# Patient Record
Sex: FEMALE | Race: WHITE
[De-identification: names, ages, dates, MRNs, and addresses within clinical notes are randomized per-mention and may not be internally consistent; named-entity substitution may affect disease eponyms.]

---

## 2021-08-27 ENCOUNTER — HOSPITAL ENCOUNTER (EMERGENCY)
Dept: HOSPITAL 61 - ER | Age: 41
Discharge: HOME | End: 2021-08-27
Payer: MEDICAID

## 2021-08-27 VITALS — HEIGHT: 64 IN | BODY MASS INDEX: 29.36 KG/M2 | WEIGHT: 171.96 LBS

## 2021-08-27 VITALS — DIASTOLIC BLOOD PRESSURE: 109 MMHG | SYSTOLIC BLOOD PRESSURE: 227 MMHG

## 2021-08-27 DIAGNOSIS — I10: Primary | ICD-10-CM

## 2021-08-27 DIAGNOSIS — Z88.6: ICD-10-CM

## 2021-08-27 DIAGNOSIS — R51.9: ICD-10-CM

## 2021-08-27 DIAGNOSIS — Z88.1: ICD-10-CM

## 2021-08-27 DIAGNOSIS — Z88.5: ICD-10-CM

## 2021-08-27 LAB
ALBUMIN SERPL-MCNC: 3.6 G/DL (ref 3.4–5)
ALBUMIN/GLOB SERPL: 1 {RATIO} (ref 1–1.7)
ALP SERPL-CCNC: 121 U/L (ref 46–116)
ALT SERPL-CCNC: 35 U/L (ref 14–59)
ANION GAP SERPL CALC-SCNC: 10 MMOL/L (ref 6–14)
APTT PPP: YELLOW S
AST SERPL-CCNC: 19 U/L (ref 15–37)
BACTERIA #/AREA URNS HPF: 0 /HPF
BASOPHILS # BLD AUTO: 0 X10^3/UL (ref 0–0.2)
BASOPHILS NFR BLD: 1 % (ref 0–3)
BILIRUB SERPL-MCNC: 0.2 MG/DL (ref 0.2–1)
BILIRUB UR QL STRIP: NEGATIVE
BUN SERPL-MCNC: 13 MG/DL (ref 7–20)
BUN/CREAT SERPL: 16 (ref 6–20)
CALCIUM SERPL-MCNC: 8.9 MG/DL (ref 8.5–10.1)
CHLORIDE SERPL-SCNC: 105 MMOL/L (ref 98–107)
CK SERPL-CCNC: 58 U/L (ref 26–192)
CO2 SERPL-SCNC: 28 MMOL/L (ref 21–32)
CREAT SERPL-MCNC: 0.8 MG/DL (ref 0.6–1)
EOSINOPHIL NFR BLD: 0.1 X10^3/UL (ref 0–0.7)
EOSINOPHIL NFR BLD: 1 % (ref 0–3)
ERYTHROCYTE [DISTWIDTH] IN BLOOD BY AUTOMATED COUNT: 13.4 % (ref 11.5–14.5)
FIBRINOGEN PPP-MCNC: CLEAR MG/DL
GFR SERPLBLD BASED ON 1.73 SQ M-ARVRAT: 79 ML/MIN
GLUCOSE SERPL-MCNC: 90 MG/DL (ref 70–99)
HCT VFR BLD CALC: 39 % (ref 36–47)
HGB BLD-MCNC: 13.6 G/DL (ref 12–15.5)
LYMPHOCYTES # BLD: 2.5 X10^3/UL (ref 1–4.8)
LYMPHOCYTES NFR BLD AUTO: 27 % (ref 24–48)
MCH RBC QN AUTO: 30 PG (ref 25–35)
MCHC RBC AUTO-ENTMCNC: 35 G/DL (ref 31–37)
MCV RBC AUTO: 87 FL (ref 79–100)
MONO #: 0.7 X10^3/UL (ref 0–1.1)
MONOCYTES NFR BLD: 8 % (ref 0–9)
NEUT #: 6.1 X10^3/UL (ref 1.8–7.7)
NEUTROPHILS NFR BLD AUTO: 64 % (ref 31–73)
NITRITE UR QL STRIP: NEGATIVE
PH UR STRIP: 7 [PH]
PLATELET # BLD AUTO: 259 X10^3/UL (ref 140–400)
POTASSIUM SERPL-SCNC: 3.6 MMOL/L (ref 3.5–5.1)
PROT SERPL-MCNC: 7.1 G/DL (ref 6.4–8.2)
PROT UR STRIP-MCNC: NEGATIVE MG/DL
RBC # BLD AUTO: 4.47 X10^6/UL (ref 3.5–5.4)
RBC #/AREA URNS HPF: 0 /HPF (ref 0–2)
SODIUM SERPL-SCNC: 143 MMOL/L (ref 136–145)
UROBILINOGEN UR-MCNC: 0.2 MG/DL
WBC # BLD AUTO: 9.5 X10^3/UL (ref 4–11)
WBC #/AREA URNS HPF: (no result) /HPF (ref 0–4)

## 2021-08-27 PROCEDURE — 99284 EMERGENCY DEPT VISIT MOD MDM: CPT

## 2021-08-27 PROCEDURE — 81025 URINE PREGNANCY TEST: CPT

## 2021-08-27 PROCEDURE — 82553 CREATINE MB FRACTION: CPT

## 2021-08-27 PROCEDURE — 96375 TX/PRO/DX INJ NEW DRUG ADDON: CPT

## 2021-08-27 PROCEDURE — 80053 COMPREHEN METABOLIC PANEL: CPT

## 2021-08-27 PROCEDURE — 36415 COLL VENOUS BLD VENIPUNCTURE: CPT

## 2021-08-27 PROCEDURE — 85025 COMPLETE CBC W/AUTO DIFF WBC: CPT

## 2021-08-27 PROCEDURE — 96361 HYDRATE IV INFUSION ADD-ON: CPT

## 2021-08-27 PROCEDURE — 93005 ELECTROCARDIOGRAM TRACING: CPT

## 2021-08-27 PROCEDURE — 81001 URINALYSIS AUTO W/SCOPE: CPT

## 2021-08-27 PROCEDURE — 84484 ASSAY OF TROPONIN QUANT: CPT

## 2021-08-27 PROCEDURE — 96374 THER/PROPH/DIAG INJ IV PUSH: CPT

## 2021-08-27 NOTE — EKG
[facility]

              [address] [place] [address]

Test Date:    2021               Test Time:    17:42:46

Pat Name:     PEDRO AMEZQUITA         Department:   

Patient ID:   [id number]           Room:          

Gender:       F                        Technician:   

:          1980               Requested By: ARCHIE CHRISTENSEN

Order Number: [id number]           Reading MD:     

                                 Measurements

Intervals                              Axis          

Rate:         79                       P:            64

WV:           134                      QRS:          22

QRSD:         86                       T:            15

QT:           386                                    

QTc:          444                                    

                           Interpretive Statements

SINUS RHYTHM

LEFT ATRIAL ABNORMALITY

ABNORMAL ECG

RI6.02

No previous ECG available for comparison

## 2021-08-27 NOTE — PHYS DOC
Past Medical History


Additional Past Medical Histor:  PTSD, "THYROID ISSUES."


Past Surgical History:  Tubal ligation


Additional Past Surgical Histo:  THYROIDECTOMY, CERVICAL CANCER, TUBAL REVERSAL,

"BLADDER SX",





General Adult


EDM:


Chief Complaint:  HEADACHE





HPI:


HPI:





Patient is a 41  year old presents to the emergency department complaining of 

high blood pressure with headache for the past 2 weeks.  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 

a traveling  and is in town only for a few more days and returns 

back home to [place] this coming Monday.  Patient reports she and her primary 

care physician have had problems maintaining a normal blood pressure, states 

there have been times she has presented to the emergency department and does 

well with IV hydralazine.  Patient reports she has a frontal headache which is 

normal for her when her blood pressure is high.  Patient denies thunderclap 

onset.  Patient denies visual disturbances, dizziness, syncopal episodes, denies

chest pain, shortness of breath, chest congestion or nasal congestion.  Patient 

denies any recent fever or chills.  Patient denies any other physical concerns 

or physical complaints.  Patient reports she was a cigarette smoker and quit 

years ago, denies drinking alcohol, denies illicit drug use or marijuana use.  

Patient reports taking Bystolic and olmesartan for blood pressure at home.





Review of Systems:


Review of Systems:


14 body systems of review of systems have been reviewed.  See HPI for pertinent 

positives and negative responses, otherwise all other systems are negative, 

nonpertinent or noncontributory.


Constitutional: Negative except as outlined in HPI above.


Skin: Negative except as outlined in HPI above.


Eyes:   Negative except as outlined in HPI above.


HENT: Negative except as outlined in HPI above.


Respiratory:   Negative except as outlined in HPI above.


Cardiovascular:   Negative except as outlined in HPI above.


GI:   Negative except as outlined in HPI above.


:  Negative except as outlined in HPI above.


Musculoskeletal:   Negative except as outlined in HPI above.


Integument:   Negative except as outlined in HPI above.


Neurologic:   Negative except as outlined in HPI above.


Endocrine:   Negative except as outlined in HPI above.


Lymphatic:  Negative except as outlined in HPI above.


Psychiatric:  Negative except as outlined in HPI above.





Heart Score:


C/O Chest Pain:  No


Risk Factors:


Risk Factors:  DM, Current or recent (<one month) smoker, HTN, HLP, family 

history of CAD, obesity.


Risk Scores:


Score 0 - 3:  2.5% MACE over next 6 weeks - Discharge Home


Score 4 - 6:  20.3% MACE over next 6 weeks - Admit for Clinical Observation


Score 7 - 10:  72.7% MACE over next 6 weeks - Early Invasive Strategies





Current Medications:





Current Medications








 Medications


  (Trade)  Dose


 Ordered  Sig/Yousif  Start Time


 Stop Time Status Last Admin


Dose Admin


 


 Hydralazine HCl


  (Apresoline Inj)  10 mg  1X  ONCE  8/27/21 17:15


 8/27/21 17:16 DC 8/27/21 18:04


10 MG


 


 Ketorolac


 Tromethamine


  (Toradol 30mg


 Vial)  30 mg  1X  ONCE  8/27/21 17:15


 8/27/21 17:16 DC 8/27/21 18:05


30 MG


 


 Sodium Chloride  1,000 ml @ 


 1,000 mls/hr  1X  ONCE  8/27/21 17:15


 8/27/21 18:14 DC 8/27/21 18:08


1,000 MLS/HR











Allergies:


Allergies:





Allergies








Coded Allergies Type Severity Reaction Last Updated Verified


 


  acetaminophen Allergy Unknown  8/27/21 Yes


 


  hydrocodone Allergy Unknown  8/27/21 Yes


 


  meperidine Allergy Unknown  8/27/21 Yes











Physical Exam:


PE:





Constitutional: Well developed, well nourished, no acute distress, non-toxic 

appearance.  41-year-old female in no apparent distress.


HENT: Normocephalic, atraumatic.


Eyes: Conjunctiva normal, no discharge.


Neck: Normal range of motion, no stridor.


Cardiovascular: No cyanosis appreciated, distal cap refill less than 2 seconds. 

Heart sounds S1-S2 to auscultation, regular rate and rhythm.


Lungs & Thorax: Patient is in no respiratory distress, no audible adventitious 

lung sounds appreciated.  Lung sounds clear to auscultate all lung fields.  

Normal work of breathing.


Abdomen: Nontender, no abnormalities noted.


Skin: Warm, dry, no erythema, no rash.  


Back: No tenderness, no deformities.


Extremities: No tenderness, no cyanosis, no clubbing, ROM intact, no edema.  


Neurologic: Alert and oriented X 3, normal motor function, normal sensory 

function, no focal deficits noted. 


Psychologic: Affect normal, judgement normal, mood normal.





Current Patient Data:


Labs:





Laboratory Tests








Test


 8/27/21


17:25 8/27/21


17:35 8/27/21


17:36


 


White Blood Count 9.5 x10^3/uL   


 


Red Blood Count 4.47 x10^6/uL   


 


Hemoglobin 13.6 g/dL   


 


Hematocrit 39.0 %   


 


Mean Corpuscular Volume 87 fL   


 


Mean Corpuscular Hemoglobin 30 pg   


 


Mean Corpuscular Hemoglobin


Concent 35 g/dL 


 


 





 


Red Cell Distribution Width 13.4 %   


 


Platelet Count 259 x10^3/uL   


 


Neutrophils (%) (Auto) 64 %   


 


Lymphocytes (%) (Auto) 27 %   


 


Monocytes (%) (Auto) 8 %   


 


Eosinophils (%) (Auto) 1 %   


 


Basophils (%) (Auto) 1 %   


 


Neutrophils # (Auto) 6.1 x10^3/uL   


 


Lymphocytes # (Auto) 2.5 x10^3/uL   


 


Monocytes # (Auto) 0.7 x10^3/uL   


 


Eosinophils # (Auto) 0.1 x10^3/uL   


 


Basophils # (Auto) 0.0 x10^3/uL   


 


Sodium Level 143 mmol/L   


 


Potassium Level 3.6 mmol/L   


 


Chloride Level 105 mmol/L   


 


Carbon Dioxide Level 28 mmol/L   


 


Anion Gap 10   


 


Blood Urea Nitrogen 13 mg/dL   


 


Creatinine 0.8 mg/dL   


 


Estimated GFR


(Cockcroft-Gault) 79.0 


 


 





 


BUN/Creatinine Ratio 16   


 


Glucose Level 90 mg/dL   


 


Calcium Level 8.9 mg/dL   


 


Total Bilirubin 0.2 mg/dL   


 


Aspartate Amino Transf


(AST/SGOT) 19 U/L 


 


 





 


Alanine Aminotransferase


(ALT/SGPT) 35 U/L 


 


 





 


Alkaline Phosphatase 121 U/L   


 


Creatine Kinase 58 U/L   


 


Creatine Kinase MB (Mass) < 0.5 ng/mL   


 


Creatine Kinase MB Relative


Index  % 


 


 





 


Troponin I Quantitative < 0.017 ng/mL   


 


Total Protein 7.1 g/dL   


 


Albumin 3.6 g/dL   


 


Albumin/Globulin Ratio 1.0   


 


Urine Collection Type  Unknown  


 


Urine Color  Yellow  


 


Urine Clarity  Clear  


 


Urine pH  7.0  


 


Urine Specific Gravity  1.010  


 


Urine Protein  Negative mg/dL  


 


Urine Glucose (UA)  Negative mg/dL  


 


Urine Ketones (Stick)  Negative mg/dL  


 


Urine Blood  Negative  


 


Urine Nitrite  Negative  


 


Urine Bilirubin  Negative  


 


Urine Urobilinogen Dipstick  0.2 mg/dL  


 


Urine Leukocyte Esterase  Negative  


 


Urine RBC  0 /HPF  


 


Urine WBC  Occ /HPF  


 


Urine Squamous Epithelial


Cells 


 Mod /LPF 


 





 


Urine Bacteria  0 /HPF  


 


Bedside Urine HCG, Qualitative   Hcg negative 








Current Medications








 Medications


  (Trade)  Dose


 Ordered  Sig/Yousif


 Route


 PRN Reason  Start Time


 Stop Time Status Last Admin


Dose Admin


 


 Sodium Chloride  1,000 ml @ 


 1,000 mls/hr  1X  ONCE


 IV


   8/27/21 17:15


 8/27/21 18:14 DC 8/27/21 18:08


1,000 MLS/HR


 


 Ketorolac


 Tromethamine


  (Toradol 30mg


 Vial)  30 mg  1X  ONCE


 IVP


   8/27/21 17:15


 8/27/21 17:16 DC 8/27/21 18:05


30 MG


 


 Hydralazine HCl


  (Apresoline Inj)  10 mg  1X  ONCE


 IVP


   8/27/21 17:15


 8/27/21 17:16 DC 8/27/21 18:04


10 MG








                                Laboratory Tests








Test


 8/27/21


17:25 8/27/21


17:35 8/27/21


17:36


 


White Blood Count


 9.5 x10^3/uL


(4.0-11.0) 


 





 


Red Blood Count


 4.47 x10^6/uL


(3.50-5.40) 


 





 


Hemoglobin


 13.6 g/dL


(12.0-15.5) 


 





 


Hematocrit


 39.0 %


(36.0-47.0) 


 





 


Mean Corpuscular Volume


 87 fL ()


 


 





 


Mean Corpuscular Hemoglobin 30 pg (25-35)    


 


Mean Corpuscular Hemoglobin


Concent 35 g/dL


(31-37) 


 





 


Red Cell Distribution Width


 13.4 %


(11.5-14.5) 


 





 


Platelet Count


 259 x10^3/uL


(140-400) 


 





 


Neutrophils (%) (Auto) 64 % (31-73)    


 


Lymphocytes (%) (Auto) 27 % (24-48)    


 


Monocytes (%) (Auto) 8 % (0-9)    


 


Eosinophils (%) (Auto) 1 % (0-3)    


 


Basophils (%) (Auto) 1 % (0-3)    


 


Neutrophils # (Auto)


 6.1 x10^3/uL


(1.8-7.7) 


 





 


Lymphocytes # (Auto)


 2.5 x10^3/uL


(1.0-4.8) 


 





 


Monocytes # (Auto)


 0.7 x10^3/uL


(0.0-1.1) 


 





 


Eosinophils # (Auto)


 0.1 x10^3/uL


(0.0-0.7) 


 





 


Basophils # (Auto)


 0.0 x10^3/uL


(0.0-0.2) 


 





 


Sodium Level


 143 mmol/L


(136-145) 


 





 


Potassium Level


 3.6 mmol/L


(3.5-5.1) 


 





 


Chloride Level


 105 mmol/L


() 


 





 


Carbon Dioxide Level


 28 mmol/L


(21-32) 


 





 


Anion Gap 10 (6-14)    


 


Blood Urea Nitrogen


 13 mg/dL


(7-20) 


 





 


Creatinine


 0.8 mg/dL


(0.6-1.0) 


 





 


Estimated GFR


(Cockcroft-Gault) 79.0  


 


 





 


BUN/Creatinine Ratio 16 (6-20)    


 


Glucose Level


 90 mg/dL


(70-99) 


 





 


Calcium Level


 8.9 mg/dL


(8.5-10.1) 


 





 


Total Bilirubin


 0.2 mg/dL


(0.2-1.0) 


 





 


Aspartate Amino Transferase


(AST) 19 U/L (15-37)


 


 





 


Alanine Aminotransferase (ALT)


 35 U/L (14-59)


 


 





 


Alkaline Phosphatase


 121 U/L


()  H 


 





 


Creatine Kinase


 58 U/L


() 


 





 


Creatine Kinase MB (Mass)


 < 0.5 ng/mL


(0.0-3.6) 


 





 


Creatine Kinase MB Relative


Index  % (0-4)  


 


 





 


Troponin I Quantitative


 < 0.017 ng/mL


(0.000-0.055) 


 





 


Total Protein


 7.1 g/dL


(6.4-8.2) 


 





 


Albumin


 3.6 g/dL


(3.4-5.0) 


 





 


Albumin/Globulin Ratio 1.0 (1.0-1.7)    


 


Urine Collection Type  Unknown   


 


Urine Color  Yellow   


 


Urine Clarity  Clear   


 


Urine pH


 


 7.0 (<5.0-8.0)


 





 


Urine Specific Gravity


 


 1.010


(1.000-1.030) 





 


Urine Protein


 


 Negative mg/dL


(NEG-TRACE) 





 


Urine Glucose (UA)


 


 Negative mg/dL


(NEG) 





 


Urine Ketones (Stick)


 


 Negative mg/dL


(NEG) 





 


Urine Blood


 


 Negative (NEG)


 





 


Urine Nitrite


 


 Negative (NEG)


 





 


Urine Bilirubin


 


 Negative (NEG)


 





 


Urine Urobilinogen Dipstick


 


 0.2 mg/dL (0.2


mg/dL) 





 


Urine Leukocyte Esterase


 


 Negative (NEG)


 





 


Urine RBC  0 /HPF (0-2)   


 


Urine WBC


 


 Occ /HPF (0-4)


 





 


Urine Squamous Epithelial


Cells 


 Mod /LPF  


 





 


Urine Bacteria


 


 0 /HPF (0-FEW)


 





 


POC Urine HCG, Qualitative


 


 


 Hcg negative


(Negative)





                                Laboratory Tests


8/27/21 17:25








                                Laboratory Tests


8/27/21 17:25








Vital Signs:





                                   Vital Signs








  Date Time  Temp Pulse Resp B/P (MAP) Pulse Ox O2 Delivery O2 Flow Rate FiO2


 


8/27/21 18:04    187/85    


 


8/27/21 16:40 98.9 78 16  98 Room Air  





 98.9       











EKG:


EKG:


EKG performed at 1742 by ED staff shows a normal sinus rhythm without ectopy 

heart rate 79 bpm, SC normal 0.134, QTc interval 0.444, no acute STEMI, no ACS, 

no acute ischemia appreciated, EKG interpreted by ED attending physician Dr. Huston.





Radiology/Procedures:


Radiology/Procedures:


[]





Course & Med Decision Making:


Course & Med Decision Making


Pertinent Labs and Imaging studies reviewed. (See chart for details)





41-year-old female, vital signs reviewed, presents emergency department 

concerning high blood pressure problems physical examination concerning for 

hypertensive urgency, will rule out endorgan damage, will give 10 mg hydralazine

 IV, EKG, troponin.





Patient's serum labs unremarkable, no evidence of endorgan damage, EKG within 

normal limits, upon reevaluation of the patient, blood pressure is 181/89, 

discussed findings with patient, patient reports she will be returning back home

 to [place] this coming Monday and will follow up with her primary care 

physician regarding her blood pressure problems.  Patient is in no apparent 

distress, nontoxic in appearance, is hemodynamically stable at discharge time, 

patient is amendable to ED discharge planning.





Discussed with the patient all findings and diagnostic testing as well as the 

need to follow-up with their primary care provider for further evaluation and 

treatment or return to the ED if any new or worsening symptoms.  Strict return 

precautions were also discussed at length, the patient voiced understanding and 

agreement with the discharge planning.  The patient was nontoxic in appearance, 

in no apparent distress, and hemodynamically stable at the time of disposition.





Dragon Disclaimer:


Dragon Disclaimer:


This electronic medical record was generated, in whole or in part, using a voice

 recognition dictation system.





Departure


Departure


Impression:  


   Primary Impression:  


   Poorly-controlled hypertension


Disposition:  01 HOME / SELF CARE / HOMELESS


Condition:  GOOD


Referrals:  


NON,STAFF (PCP)


Patient Instructions:  Hypertension





Additional Instructions:  


You were seen today in the emergency department for high blood pressure.  An EKG

 and serum blood lab was drawn to rule out any endorgan damage related to your 

hypertensive episode.  You were given IV hydralazine 10 mg while you are in the 

emergency department.  Your blood pressure has lowered as expected, as we 

discussed at length please follow-up with your primary care physician this 

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  

Please return to the emergency department for worsening symptoms or other 

concerns.  Thank you for visiting our Emergency Department.  It was a pleasure 

taking care of you today in the emergency department and we appreciate you 

trusting us with your care. If any additional problems come up don't hesitate to

 return to visit us. Please follow up with your primary care provider so they 

can plan additional care if needed and know about the problem that you had. If 

symptoms worsen come back to the Emergency Department. Any concerning symptoms t

hat start such as chest pain, shortness of air, weakness or numbness on one side

 of the body, running high fevers or any other concerning symptoms return to the

 ER.





EMERGENCY DEPARTMENT GENERAL DISCHARGE INSTRUCTIONS





Thank you for coming to [facility] Emergency Department (ED) 

today and 


trusting us with you care.  We trust that you had a positive experience in our 

Emergency 


Department.  If you wish to speak to the department management, you may call the

 Director at 


[phone].





YOUR FOLLOW UP INSTRUCTIONS ARE AS FOLLOWS:





1.  Do you have a private Doctor?  If you do not have a private doctor, please 

ask for a 


resource list of physicians or clinics that may be able to assist you with 

follow up care.





2.  The Emergency Physicain has interpreted your x-rays.  The X-Ray specialist 

will also 


review them.  If there is a change in the findings, you will be notified in 48 

hours when at 


all possible.





3.  A lab test or culture has been done, your results will be reviewed and you 

will be 


notified if you need a change in treatment.





ADDITIONAL INSTRUCTIONS AND INFORMATION:





1.  Your care today has been supervised by a physician who is specially trained 

in emergency 


care.  Many problems require more than one evaluation for a complete diagnosis 

and 


treatment.  We recommend that you schedule your follow up appointment as 

recommended to 


ensure complete treatment of you illness or injury.  If you are unable to obtain

 follow up 


care and continue to have a problem, or if your condition worsens, we recommend 

that you 


return to the ED.





2.  We are not able to safely determine your condition over the phone nor are we

 able to 


give sound medical advice over the phone.  For these safety reasons, if you call

 for medical 


advice we will ask you to come to the ED for further evaluation.





3.  If you have any questions regarding these discharge instructions please call

 the ED at 


[phone].





SAFETY INFORMATION:





In the interest of safety, wellness, and injury prevention; we encourage you to 

wear your 


sealbelt, if you smoke; quite smoking, and we encourage family to use a 

protective helmet 


for bicycling and other sporting events that present an increased risk for head 

injury.





IF YOUR SYMPTOMS WORSEN OR NEW SYMPTOMS DEVELOP, OR YOU HAVE CONCERNS ABOUT YOUR

 CONDITION; 


OR IF YOUR CONDITION WORSENS WHILE YOU ARE WAITING FOR YOUR FOLLOW UP APPOINTMEN

T; EITHER 


CONTACT YOUR PRIMARY CARE DOCTOR, THE PHYSICIAN WHOSE NAME AND NUMBER YOU WERE 

GIVEN, OR 


RETURN TO THE ED IMMEDIATELY.











ARCHIE CHRISTENSEN       Aug 27, 2021 19:04